# Patient Record
Sex: MALE | Race: ASIAN | NOT HISPANIC OR LATINO | URBAN - METROPOLITAN AREA
[De-identification: names, ages, dates, MRNs, and addresses within clinical notes are randomized per-mention and may not be internally consistent; named-entity substitution may affect disease eponyms.]

---
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
## 2024-01-09 NOTE — ED PROVIDER NOTE - NS_EDPROVIDERDISPOUSERTYPE_ED_A_ED
Nerve Block    Date/Time: 1/9/2024 8:37 AM    Performed by: Simon Flor MD  Authorized by: Simon Flor MD    Block Type: popliteal  Laterality:  Right  Patient Location:  Pre-op  Indication: post-op pain management at surgeon's request and at surgeon's request    Preanesthetic Checklist Patient Identified (2 criteria), Block Plan Confirmed, Resuscitation Equipment Available, Supplemental O2 (if needed), Allergies Confirmed, Block Site Marked (if applicable), Monitors Applied, Aseptic Technique, Coagulation Status, Necessary Block Equipment Present, Timeout Performed, IV Access Functioning, Consent Verified, Drugs/Solutions Labeled and Sedation Given (if needed)    Patient Position:  Supine  Prep:  Chlorhexidine gluconate (CHG)  Max Sterile Barrier Technique:  Hand Washing, Cap/Mask, Sterile gloves and Sterile gel & probe cover  Monitoring:  Blood pressure, continuous capnography, continuous pulse oximetry, EKG and heart rate  Injection Technique:  Single-shot  Procedures: ultrasound guided and nerve stimulator    Needle Type:  Stimulating  Needle Gauge:  20 G  Needle Length:  4 in  Needle Localization:  Ultrasound guidance and nerve stimulator  Test Dose:  Negative  Physical status during block:  Sedated  Injection Assessment:  Negative aspiration for heme, no paresthesia on injection, incremental injection, local visualized surrounding nerve on ultrasound and no resistance to injection  Patient Condition:  Tolerated well, no immediate complications  Slowly Injected: Yes    Start Time:1/9/2024 8:37 AM  Stop Time: 1/9/2024 8:43 AM   Risks, benefits, alternatives to block discussed pre-block.  All questions answered. Block performed at proceduralist's request for post-op pain management. Patient agreed to proceed. No pain/paresthesias with needle placement or injection.  Pt tolerated procedure well. Sterile ultrasound probe cover used and ultrasound picture saved to electronic medical record in Epic  Media Tab. See Epic for block start/stop times and medications administered.         Attending Attestation (For Attendings USE Only)...

## 2024-11-28 NOTE — ED PROVIDER NOTE - DURATION
week(s)/1 - Saddle pulmonary embolus.  - I spoke to the patient's son, Shon on the phone. Discussed stopping Lovenox and focusing on symptoms and comfort. Shon is in agreement. d/c Lovenox.    - Symptom directed approach - Saddle pulmonary embolus.  - family in agreement to d/c Lovenox.    - Symptom directed approach

## 2025-05-13 NOTE — ED PROVIDER NOTE - RESPIRATORY NEGATIVE STATEMENT, MLM
Unknown, Doctor  [facility] PreAdmits  Scheduled Appointment: 05/19/2025    [facility]  [address]  Scheduled Appointment: 05/19/2025    Gilda-Anam Das  [facility]  [address]  Scheduled Appointment: 06/05/2025    Stefano Mason  [facility]  [address]  Scheduled Appointment: 07/07/2025    
no chest pain, no cough, and no shortness of breath.